# Patient Record
Sex: FEMALE | Race: WHITE
[De-identification: names, ages, dates, MRNs, and addresses within clinical notes are randomized per-mention and may not be internally consistent; named-entity substitution may affect disease eponyms.]

---

## 2019-12-30 ENCOUNTER — HOSPITAL ENCOUNTER (EMERGENCY)
Dept: HOSPITAL 95 - ER | Age: 39
Discharge: HOME | End: 2019-12-30
Payer: COMMERCIAL

## 2019-12-30 VITALS — BODY MASS INDEX: 25.49 KG/M2 | HEIGHT: 61 IN | WEIGHT: 134.99 LBS

## 2019-12-30 DIAGNOSIS — K21.9: ICD-10-CM

## 2019-12-30 DIAGNOSIS — F17.200: ICD-10-CM

## 2019-12-30 DIAGNOSIS — E86.0: ICD-10-CM

## 2019-12-30 DIAGNOSIS — E87.6: Primary | ICD-10-CM

## 2019-12-30 DIAGNOSIS — R19.7: ICD-10-CM

## 2019-12-30 LAB
ALBUMIN SERPL BCP-MCNC: 3.3 G/DL (ref 3.4–5)
ALBUMIN/GLOB SERPL: 0.9 {RATIO} (ref 0.8–1.8)
ALT SERPL W P-5'-P-CCNC: 25 U/L (ref 12–78)
ANION GAP SERPL CALCULATED.4IONS-SCNC: 5 MMOL/L (ref 6–16)
AST SERPL W P-5'-P-CCNC: 16 U/L (ref 12–37)
BASOPHILS # BLD AUTO: 0.04 K/MM3 (ref 0–0.23)
BASOPHILS NFR BLD AUTO: 1 % (ref 0–2)
BILIRUB SERPL-MCNC: 0.2 MG/DL (ref 0.1–1)
BUN SERPL-MCNC: 7 MG/DL (ref 8–24)
CALCIUM SERPL-MCNC: 8.6 MG/DL (ref 8.5–10.1)
CHLORIDE SERPL-SCNC: 114 MMOL/L (ref 98–108)
CO2 SERPL-SCNC: 23 MMOL/L (ref 21–32)
CREAT SERPL-MCNC: 0.7 MG/DL (ref 0.4–1)
DEPRECATED RDW RBC AUTO: 41.7 FL (ref 35.1–46.3)
EOSINOPHIL # BLD AUTO: 0.16 K/MM3 (ref 0–0.68)
EOSINOPHIL NFR BLD AUTO: 2 % (ref 0–6)
ERYTHROCYTE [DISTWIDTH] IN BLOOD BY AUTOMATED COUNT: 13.4 % (ref 11.7–14.2)
GLOBULIN SER CALC-MCNC: 3.5 G/DL (ref 2.2–4)
GLUCOSE SERPL-MCNC: 87 MG/DL (ref 70–99)
HCT VFR BLD AUTO: 35.2 % (ref 33–51)
HGB BLD-MCNC: 11.2 G/DL (ref 11.5–16)
IMM GRANULOCYTES # BLD AUTO: 0.01 K/MM3 (ref 0–0.1)
IMM GRANULOCYTES NFR BLD AUTO: 0 % (ref 0–1)
KETONES UR STRIP-MCNC: (no result) MG/DL
LEUKOCYTE ESTERASE UR QL STRIP: (no result)
LYMPHOCYTES # BLD AUTO: 2.25 K/MM3 (ref 0.84–5.2)
LYMPHOCYTES NFR BLD AUTO: 29 % (ref 21–46)
MAGNESIUM SERPL-MCNC: 1.6 MG/DL (ref 1.6–2.4)
MCHC RBC AUTO-ENTMCNC: 31.8 G/DL (ref 31.5–36.5)
MCV RBC AUTO: 85 FL (ref 80–100)
MONOCYTES # BLD AUTO: 0.79 K/MM3 (ref 0.16–1.47)
MONOCYTES NFR BLD AUTO: 10 % (ref 4–13)
NEUTROPHILS # BLD AUTO: 4.55 K/MM3 (ref 1.96–9.15)
NEUTROPHILS NFR BLD AUTO: 58 % (ref 41–73)
NRBC # BLD AUTO: 0 K/MM3 (ref 0–0.02)
NRBC BLD AUTO-RTO: 0 /100 WBC (ref 0–0.2)
PLATELET # BLD AUTO: 248 K/MM3 (ref 150–400)
POTASSIUM SERPL-SCNC: 3.1 MMOL/L (ref 3.5–5.5)
PROT SERPL-MCNC: 6.8 G/DL (ref 6.4–8.2)
PROT UR STRIP-MCNC: (no result) MG/DL
SODIUM SERPL-SCNC: 142 MMOL/L (ref 136–145)
SP GR SPEC: 1.02 (ref 1–1.02)
UROBILINOGEN UR STRIP-MCNC: (no result) MG/DL
WBC #/AREA URNS HPF: (no result) /HPF (ref 0–5)

## 2025-03-21 NOTE — NUR
SHIFT SUMMARY
PT IS A/OX4. SBA IN THE ROOM TO ASSIST WITH LINES/CORDS. DRAIN TO THE ABSCESS
PLACED THIS MORNING. DRAN REMAINS PATENT. PT REPORTS MILD PAIN THROUGHOUT THIS
SHIFT, MEDICATED PER MAR. LOW-GRADE FEVER PRESENT THIS AFTERNOON, MEDICATED
WITH TYLENOL PER MAR. WHEN ASSISTING THE PT TO THE BATHROOM THIS EVENING BLOOD
WAS ASSESSED ON THE SHEETS UNDERNEATH WHERE THE PT WAS SITTING. URINE IS RED
WITH MODERATE SIZED CLOTS. BLEEDING APPEARS TO BE FROM MENSTRUATION.
CONTINUOUS NS RUNNING @ 100 ML/HR. PT CALLS APPROPRIATELY USING THE CALL
LIGHT.

## 2025-03-22 NOTE — NUR
SHIFT SUMMARY
 
NO ACUTE EVENTS DURING THE NIGHT HOURS. @HS PT FEBRILE, 101F. MEDICATED WITH
PRN TYLENOL. RECHECK TEMP. 98F. PT REPORTS EFFECTIVE. PT C/O 9-10/10 LLQ AND
LEFT GROIN PAIN. PO PRN OXYCODONE EFFECTIVE PER PT REPORT. DRAIN INSERTION
SITE C/D/I. OUTPUT FROM THE DRAIN 120MLS OF CREAMY COLOR, MILDLY THICK AND
ODOROUS DISCHARGE. PT HAS MODERATE AMOUNT OF VAGINAL BLEEDING, NO CLOTS NOTED.
SBA TO THE BSC. VAGINAL SWAB SENT TO THE LAB FOR CHLAMYDIA TESTING @0400. NS
INFUSING @100MLS/HR AS ORDERED. IV ABX'S INFUSED AS ORDERED.TELE: SINUS TACH
@100BPM. PT DENIES CHEST PAIN/PRESSURE.  PT IS A/O X4, PLEASANT, AND ABLE TO
MAKE HER NEEDS KNOWN. NO OTHER CONCERNS AT THIS TIME. CONTINUING PT EDUCATION.
BED AT THE LOWEST POSITION, CALL LIGHT W/I REACH.

## 2025-03-22 NOTE — NUR
SHIFT SUMMARY:
PATIENT A/OX4, CALM, PLEASANT AND COOPERATIVE c CARE. PATIENT DENIES
CP/PRESSURE, SOB, N/V AND DIZZINESS. PATIENT ON TELE, SR HR IN THE HIGH 80'S
BPM c OCCASIONAL PVC. PATIENT MEDICATED X1 FOR PAIN PER EMAR c GOOD EFFECT.
PATIENT HAS HAD NO FEVER THIS SHIFT. PATIENT DAY 1 POST-OP ACCORDIAN DRAINED
PLACEMENT TO L SIDE ABDOMEN ABSCESS, SITE IS PATENT DRAINING 200 MLS GREENISH
CREAMY COLOR, MALODOROUS. PATIENT HAS MOD APPETITE, CONTINENT OF
BOWEL/BLADDER, USES BSC INDEPENDENTLY T/O SHIFT. PATIENT RECEIVED IV
ABX/SCHEDULED MEDS PER EMAR.  VITAL SIGNS REVIEWED. BED IN LOWEST POSITIONED.
CALL LIGHT IN REACH.

## 2025-03-23 NOTE — NUR
SHIFT SUMMARY
PT IS A/OX4. INDEPENDENT IN THE ROOM. NO ACUTE CHANGES THROUGHOUT THIS SHIFT.
REPEAT CT COMPLETED THIS MORNING, POSSIBLY ANOTHER REPEAT TO BE COMPLETED IN
THE MORNING. PT CONTINUES RECIEVING NS @ 100 ML/HR AND IV ANTIBIOTICS. NO
FEVER PRESENT THROUGHOUT THIS SHIFT.

## 2025-03-23 NOTE — NUR
SHIFT SUMMARY
 
NO ACUTE EVENTS DURING THIS SHIFT. NS INFUSING 100MLS/HR AND IV ABX'S INFUSED
AS ORDERED.  LL INCISION SITE C/D/I. ACCORDION DRAIN OUTPUT>100MLS, MILKY
APPEARING DISCHARGE. PT CONTINUES TO HAVE BLOODY VAGINAL DISCHARGE.  TELE:  SR
@82. PT DENIES CHEST PAIN/PRESSURE/SOB.  MEDICATED AS ORDERED WITH PRN PO
TYLENOL FOR C/O 7/10 LL ABDOMINAL PAIN. PT REPORTS LEFT LEG HAS LESS OF ROM.
BED AT THE LOWEST POSITION, CALL LIGHT W/I REACH. PT IS A/O X4, ABLE TO MAKE
HER NEEDS KNOWN AND COOPERATIVE [facility] CARE.

## 2025-03-24 NOTE — NUR
Pt is scheduled on Sat 2/23/19 at 10 am with Alex   SHIFT SUMMARY
 
NO ACUTE DISTRESS/EVENTS DURING THIS SHIFT. TELE:NSR @88. @HS PT C/O 7/10 LLQ
PAIN. MEDICATED PER EMAR WITH GOOD EFFECTIVNESS. HS SNACK PROVIDED. NS
INFUSING AS ORDERED. ACCORDION DRAIN INTACT, MINIMAL OUTPUT. PT RESTING T/O
THIS SHIFT. INDEPENDENT W/I THE HOSPITAL ROOM. PT CONTINUES TO HAVE BLOODY
VAGINAL DISCHARGE. BED AT THE LOWEST POSITION, CALL LIGHT W/I REACH. PT IS A/O
X4, ABLE TO MAKE HER NEEDS KNOWN AND COOPERATIVE WITH CARE.

## 2025-03-24 NOTE — NUR
SHIFT SUMMARY
PT AOX4, COOPERATIVE, ABLE TO MAKE NEEDS KNOWN. IND IN ROOM, CONTINENT. DOES
HAVE DRAIN IN LOWER LEFT ABDOMEN DRAINING YELLOW-MILKY PURULENT DRAINAGE.
DRAINAGE FILLS ACCORDIAN DEVICE, RN SQUEEZES DEVICE TO REACTIVE SUCTION. PT
HAD COMPLAINT OF PAIN, MEDICATED PER EMAR. STILL ON DROPLET PRECAUTION FOR
RSV PER REPORT FROM NOC SHIFT NURSE. BED IN LOWEST POSITION, CALL LIGHT WITHIN
REACH.

## 2025-03-25 NOTE — NUR
SHIFT SUMMARY
PT AOX4, COOPERATIVE, ABLE TO MAKE NEEDS KNOWN. PT TOOK SHOWER TODAY, WRAPPED
DRAIN WITH LARGE TEGADERM. ON RA, TOLERATING PO AND IV MEDICATION. REPORTS
PAIN, MEDICATE PER EMAR. IND IN ROOM. BED IN LOWEST POSITION, CALL LIGHT
WITHIN REACH.

## 2025-03-26 NOTE — NUR
SHIFT SUMMARY
 
NO ACUTE EVENTS DURING THIS SHIFT. TELE: SR @38 WITH BBB. MEDICATED PER EMAR
FOR C/O 8/10 LLQ PAIN. IV ABX INFUSED AS ORDERED. NEW IV IN LAC. BED AT THE
LOWEST POSITION, CALL LIGHT W/I REACH. PT IS A/O X4, ABLE TO MAKE HER NEEDS
KNOWN AND COOPERATIVE WITH CARE.

## 2025-03-26 NOTE — NUR
SUMMARY-
AAOX4. IND IN ROOM. ON RA. ABD PAIN WELL CONTROLLED WITH EMAR PAIN MEDS. DRAIN
EMPTIED 40ML THIS DAY SHIFT. PURULENT DRAINAGE. NO ACUTE EVENTS THIS SHIFT.

## 2025-03-27 NOTE — NUR
NO ACUTE CHANGES THIS SHIFT. PATIENT INDEPENDENTLY AND COOPERATIVE WITH CARE.
DRAIN TO LLQ ABDOMEN WITH MINIMAL CLEAR/YELLOW OUTPUT. OXYCODONE AND TYLENOL
GIVEN X1 THIS SHIFT FOR PAIN WITH STATED RELIEF. A/OX4, ABLE TO MAKE NEEDS
KNOWN.

## 2025-03-27 NOTE — NUR
AAOX4 INDEPENDENT IN ROOM. USES CALL LIGHT FOR NEEDS. TELE, SR @ 93. DRESSING
CDI TO L HIP/ABDM WITH DRAIN INTACT. 5MG OXY AND TYLENOL FOR PAIN Q6 WHICH PT
TAKES TOGETHER. RECIEVING UNASYN, LAC IV. NO ACUTE NEEDS OVERNIGH.

## 2025-03-28 NOTE — NUR
SHIFT SUMMARY
PT CONT LEVEL OF CARE WITH NO ACUTE CHANGES NOTED. PT HAD REPEAT CT DONE THIS
SHIFT. AND WILL POSSIBLE DC TOMORROW. L NEPHOROSTOMY TUBE CONT TO REMAIN IN
PLACE WITH VERY LITTLE OUTPUT NOTED.

## 2025-03-29 NOTE — NUR
pt sitting up in chair, a/ox4 pleasant and cooperative with care, follows
commands well, reports mild aching at the drain site, lungs are clear t/o,
resp even and unlabored, no cough noted, hrr, no edema noted, ppp+2, cap
refill<3 sec, vs stable, afebrile, piv to lac, site is clear and patent, btx4,
abd flat soft nontender, voids without diff, skin has drain to llq site is
clear draining a light tan fluid, scant amount, macandy, up indep in room, sugey,
call light in reach.

## 2025-03-29 NOTE — NUR
PT A&O X4, VS WNL, PO INTAKE WNL, INDEPENDENT WITH MOBILITY AND ADL'S.  PT
REMAINS ON IVABX FOR INFECTION (RSV, AND ABSCESS).  REMAINS IN DROPLETT
ISOLATION. PLAN TO D/C HOME WITH PO ABX.

## 2025-03-29 NOTE — NUR
pt has been discharged to home, drain has been removed, went over discharge
instructions with her, she verbalized understanding, hard copy script for pain
meds was given to her, placed in packet, she will f/u with rochelle, iv removed
intact, faxed new medications to [facility]. left via wheelchair with cna in
attendence.